# Patient Record
Sex: FEMALE | Race: ASIAN | URBAN - METROPOLITAN AREA
[De-identification: names, ages, dates, MRNs, and addresses within clinical notes are randomized per-mention and may not be internally consistent; named-entity substitution may affect disease eponyms.]

---

## 2018-10-08 ENCOUNTER — OFFICE VISIT (OUTPATIENT)
Dept: FAMILY MEDICINE | Facility: CLINIC | Age: 43
End: 2018-10-08
Payer: COMMERCIAL

## 2018-10-08 VITALS
BODY MASS INDEX: 21.97 KG/M2 | HEIGHT: 63 IN | TEMPERATURE: 97.2 F | WEIGHT: 124 LBS | OXYGEN SATURATION: 97 % | DIASTOLIC BLOOD PRESSURE: 55 MMHG | HEART RATE: 57 BPM | SYSTOLIC BLOOD PRESSURE: 106 MMHG

## 2018-10-08 DIAGNOSIS — R13.12 OROPHARYNGEAL DYSPHAGIA: Primary | ICD-10-CM

## 2018-10-08 NOTE — MR AVS SNAPSHOT
"              After Visit Summary   10/8/2018    Susy Shanks    MRN: [id number]           Patient Information     Date Of Birth          1975        Visit Information        Provider Department      10/8/2018 11:40 AM Siomara Burkett PA-C [facility]        Today's Diagnoses     Oropharyngeal dysphagia    -  1       Follow-ups after your visit        Who to contact     Please call your clinic at [phone] to:    Ask questions about your health    Make or cancel appointments    Discuss your medicines    Learn about your test results    Speak to your doctor            Additional Information About Your Visit        MyChart Information     Sunrise is an electronic gateway that provides easy, online access to your medical records. With Sunrise, you can request a clinic appointment, read your test results, renew a prescription or communicate with your care team.     To sign up for Sunrise visit the website at [email or web address]   You will be asked to enter the access code listed below, as well as some personal information. Please follow the directions to create your username and password.     Your access code is: [email or web address]  Expires: 2019  3:42 PM     Your access code will  in 90 days. If you need help or a new code, please contact your [facility] or call [phone] for assistance.        Care EveryWhere ID     This is your Care EveryWhere ID. This could be used by other organizations to access your [facility] medical records  [id number]        Your Vitals Were     Pulse Temperature Height Pulse Oximetry BMI (Body Mass Index)       57 97.2  F (36.2  C) (Oral) 5' 2.68\" (159.2 cm) 97% 22.19 kg/m2        Blood Pressure from Last 3 Encounters:   10/08/18 106/55   13 113/74    Weight from Last 3 Encounters:   10/08/18 124 lb (56.2 kg)   13 123 lb (55.8 kg)              Today, you had the following     No orders found for display       " Primary Care Provider Office Phone # Fax #    Em Ramírez [phone] [address] [place] [id number]        Equal Access to Services     JON ANSARI : Hadii aad ku hadmaria teresafreddy Matheus, wadayoda luqadaha, qaybta kabrannonda julio, vini ibanez parkerdilan katiedarrianlake leonard. So [facility] [place] [phone].    ATENCIÓN: Si habla español, tiene a palma disposición servicios gratuitos de asistencia lingüística. Llame al [phone].    We comply with applicable federal civil rights laws and [place] laws. We do not discriminate on the basis of race, color, national origin, age, disability, sex, sexual orientation, or gender identity.            Thank you!     Thank you for choosing [facility]  for your care. Our goal is always to provide you with excellent care. Hearing back from our patients is one way we can continue to improve our services. Please take a few minutes to complete the written survey that you may receive in the mail after your visit with us. Thank you!             Your Updated Medication List - Protect others around you: Learn how to safely use, store and throw away your medicines at www.disposemymeds.org.          This list is accurate as of 10/8/18  3:42 PM.  Always use your most recent med list.                   Brand Name Dispense Instructions for use Diagnosis    hydrocortisone 2.5 % cream    ANUSOL-HC    30 g    Place  rectally 2 times daily.    External hemorrhoid       NO ACTIVE MEDICATIONS

## 2018-10-08 NOTE — PROGRESS NOTES
"  SUBJECTIVE:   Susy Shanks is a 42 year old female who presents to clinic today for the following health issues:    Dysphagia:    Change in ability to swallow for the past few months.  She feels like food gets partially stuck in the upper throat.  Has not had anything acutally stuck.  No history of heart burn. No meds.  CPE by GYN provider up to date. Occurs at the top of the throat. Not an issue with water.     Does not seem related to illness in any way.  No history of reflux or dyspepsia.  No associated nausea or decreased appetite.        Problem list and histories reviewed & adjusted, as indicated.  Additional history: as documented    Patient Active Problem List   Diagnosis   (none) - all problems resolved or deleted     No past surgical history on file.    Social History   Substance Use Topics     Smoking status: Never Smoker     Smokeless tobacco: Never Used     Alcohol use Not on file     No family history on file.      Current Outpatient Prescriptions   Medication Sig Dispense Refill     NO ACTIVE MEDICATIONS        Allergies   Allergen Reactions     Cholera Vaccine Anaphylaxis       Reviewed and updated as needed this visit by clinical staff  Tobacco  Allergies  Meds  Problems       Reviewed and updated as needed this visit by Provider  Allergies  Meds  Problems         ROS:  Constitutional, HEENT, cardiovascular, pulmonary, gi and gu systems are negative, except as otherwise noted.    OBJECTIVE:     /55  Pulse 57  Temp 97.2  F (36.2  C) (Oral)  Ht 5' 2.68\" (159.2 cm)  Wt 124 lb (56.2 kg)  SpO2 97%  BMI 22.19 kg/m2  Body mass index is 22.19 kg/(m^2).  GENERAL: healthy, alert and no distress  EYES: Eyes grossly normal to inspection, PERRL and conjunctivae and sclerae normal  HENT: ear canals and TM's normal, nose and mouth without ulcers or lesions.  Posterior pharynx pink without inflammation or exudate.  NECK: no adenopathy, no asymmetry, masses, or scars and thyroid normal to " palpation  RESP: lungs clear to auscultation - no rales, rhonchi or wheezes  CV: regular rate and rhythm, normal S1 S2, no S3 or S4, no murmur, click or rub, no peripheral edema and peripheral pulses strong  ABDOMEN: soft, nontender, no hepatosplenomegaly, no masses and bowel sounds normal  SKIN: no suspicious lesions or rashes      ASSESSMENT/PLAN:       ICD-10-CM    1. Oropharyngeal dysphagia R13.12 XR Esophagram       Not sure if this is oropharyngeal or esophageal dysphagia.  Will recommend esophagram for eval to start.   Will have patient try omeprazole for the next week.  Will notify patient of results and next steps for evaluation.    Siomara Burkett PA-C  [facility]

## 2018-10-08 NOTE — NURSING NOTE
"42 year old  Chief Complaint   Patient presents with     Problems Swallowing     Swallowing issues. Been happening a few months and reports it feels like it takes forever for her food to good down and sometimes feels stuckl in her throat.       Blood pressure 106/55, pulse 57, temperature 97.2  F (36.2  C), temperature source Oral, height 5' 2.68\" (159.2 cm), weight 124 lb (56.2 kg), SpO2 97 %, not currently breastfeeding. Body mass index is 22.19 kg/(m^2).  Patient Active Problem List   Diagnosis   (none) - all problems resolved or deleted       Wt Readings from Last 2 Encounters:   10/08/18 124 lb (56.2 kg)   07/01/13 123 lb (55.8 kg)     BP Readings from Last 3 Encounters:   10/08/18 106/55   07/01/13 113/74         Current Outpatient Prescriptions   Medication     hydrocortisone (ANUSOL-HC) 2.5 % rectal cream     NO ACTIVE MEDICATIONS     No current facility-administered medications for this visit.        Social History   Substance Use Topics     Smoking status: Never Smoker     Smokeless tobacco: Never Used     Alcohol use Not on file       Health Maintenance Due   Topic Date Due     PHQ-2 Q1 YR  11/02/1987     TETANUS IMMUNIZATION (SYSTEM ASSIGNED)  11/02/1993     HIV SCREEN (SYSTEM ASSIGNED)  11/02/1993     PAP SCREENING Q3 YR (SYSTEM ASSIGNED)  11/02/1996     INFLUENZA VACCINE (1) 09/01/2018       No results found for: PAP      October 8, 2018 11:51 AM  "

## 2020-04-21 ENCOUNTER — VIRTUAL VISIT (OUTPATIENT)
Dept: FAMILY MEDICINE | Facility: CLINIC | Age: 45
End: 2020-04-21
Payer: COMMERCIAL

## 2020-04-21 VITALS — TEMPERATURE: 98.5 F

## 2020-04-21 DIAGNOSIS — H65.91 RIGHT NON-SUPPURATIVE OTITIS MEDIA: Primary | ICD-10-CM

## 2020-04-21 RX ORDER — AMOXICILLIN 875 MG
875 TABLET ORAL 2 TIMES DAILY
Qty: 20 TABLET | Refills: 0 | Status: SHIPPED | OUTPATIENT
Start: 2020-04-21

## 2020-04-21 NOTE — PATIENT INSTRUCTIONS
Thank you for connecting by video for your visit.    I think your symptoms are most likely a viral infection or allergies.  This is likely not COVID but please let me know if your symptoms worsen. Continue to monitor your temperature.    I would like you to take ibuprofen 400mg on a regular basis up to 3 times daily with food for the next 2 days.  Use flonase nasal spray:  2 sprays in each nostril once daily.  Drink plenty of fluids.  Though I sent an antibiotic to your pharmacy I would recommend you only take it if the ear pain in persistent and does not respond to the ibuprofen.    If you take the antibiotic:  Watch for signs of allergic reaction including swelling around the mouth or eyes and the development of a rash.  If you develop any of these symptoms, stop your medication, take a benadryl (25-50 mg) and give our office a call. Consider probiotic therapy to decrease the possibility of diarrhea associated with antibiotic use.    Please let me know if you have any questions.    Elizabeth Burkett PA-C

## 2020-04-21 NOTE — PROGRESS NOTES
"Family Medicine Video Visit Note  Susy is being evaluated via a billable video visit.               Video Visit Consent     Patient was verbally read the following and verbal consent was obtained.  \"This video visit will be conducted via a call between you and your physician/provider. We have found that certain health care needs can be provided without the need for an in-person physical exam.  This service lets us provide the care you need with a video conversation.  If a prescription is necessary we can send it directly to your pharmacy.  If lab work is needed we can place an order for that and you can then stop by our lab to have the test done at a later time.    If during the course of the call the physician/provider feels a video visit is not appropriate, you will not be charged for this service.\"     (Name person giving consent:  Patient   Date verbal consent given:  4/21/2020  Time verbal consent given:  4:15 PM)    Patient would like the video invitation sent by: Text to cell phone: [phone]       After Visit Information:  Patient chose to view AVS via [employer]    Chief Complaint   Patient presents with     Throat Problem     woke up with an sore throat Monday morning, only on right side of throat. Also woke up with an headache.     Ear Problem     right ear pain               HPI     Video Start Time: 4:21 PM    Ssuy presents  for the following health issues:    Acute Illness   Acute illness concerns: Sore throat for the past 3 days. Right sided primarily. Does not feel plugged. Mild seasonal allergies. Today she noted some pain in the right ear.  She took ibuprofen earlier today and it felt better.  Pain noted again when ibuprofen wore off.  Headaches.  Has been known to grind her teeth but no history of TMJ.  Does not wear a mouth guard.   No swelling of the ear and no worsening of pain with ear movement. No noted discharge.      Fever: no    Chills/Sweats: no    Headache (location?): YES- thinks it " "is stress related with COVID    Sinus Pressure:no    Conjunctivitis:  no    Ear Pain: YES: right    Rhinorrhea: no    Congestion: YES- mild thinks allergy related    Sore Throat: YES     Cough: no    Wheeze: no    Decreased Appetite: no    Nausea: no    Vomiting: no    Diarrhea:  no    Dysuria/Freq.: no    Fatigue/Achiness: YES- thinks it is stress related    Sick/Strep Exposure: no     Therapies Tried and outcome: as noted above      Current Outpatient Medications   Medication Sig Dispense Refill     amoxicillin (AMOXIL) 875 MG tablet Take 1 tablet (875 mg) by mouth 2 times daily 20 tablet 0     NO ACTIVE MEDICATIONS        Allergies   Allergen Reactions     Cholera Vaccine Anaphylaxis       Reviewed and updated as needed this visit by Provider  Tobacco  Allergies  Meds  Problems  Med Hx  Surg Hx  Fam Hx              Review of Systems:     ROS COMP: CONSTITUTIONAL: NEGATIVE for fever, chills, change in weight  EYES: NEGATIVE for vision changes or irritation  ENT/MOUTH: NEGATIVE for ear, mouth  Problems.  Throat as noted above.  RESP: NEGATIVE for significant cough or SOB  CV: NEGATIVE for chest pain, palpitations or peripheral edema  GI: NEGATIVE for nausea, abdominal pain, heartburn, or change in bowel habits         Physical Exam:     Temp 98.5  F (36.9  C) Patient reported today  Estimated body mass index is 22.19 kg/m  as calculated from the following:    Height as of 10/8/18: 1.592 m (5' 2.68\").    Weight as of 10/8/18: 56.2 kg (124 lb).    GENERAL: healthy, alert and no distress  EYES: Eyes grossly normal to inspection, PERRL and conjunctivae and sclerae normal  HENT: oropharynx clear and oral mucous membranes moist.  Posterior pharynx without inflammation or exudate.   NECK: Possible adenopathy on the right--patient notes tendernesss with palpation. No jaw pain and LING  RESP: No cough.  Breathing normally.  No wheeze  SKIN: no suspicious lesions or rashes  PSYCH: mentation appears normal, affect " normal/bright          Assessment and Plan     Assessment & Plan       ICD-10-CM    1. Right non-suppurative otitis media  H65.91 amoxicillin (AMOXIL) 875 MG tablet   Likely viral vs allergic etiology. Recommend avoiding the use of antibiotic if possible.    I think your symptoms are most likely a viral infection or allergies.  This is likely not COVID but please let me know if your symptoms worsen. Continue to monitor your temperature.    I would like you to take ibuprofen 400mg on a regular basis up to 3 times daily with food for the next 2 days.  Use flonase nasal spray:  2 sprays in each nostril once daily.  Drink plenty of fluids.  Though I sent an antibiotic to your pharmacy I would recommend you only take it if the ear pain in persistent and does not respond to the ibuprofen.    If you take the antibiotic:  Watch for signs of allergic reaction including swelling around the mouth or eyes and the development of a rash.  If you develop any of these symptoms, stop your medication, take a benadryl (25-50 mg) and give our office a call. Consider probiotic therapy to decrease the possibility of diarrhea associated with antibiotic use.     Follow up if you have any worsening or persistent problems or concerns.        Return if symptoms worsen or fail to improve.    Siomara Burkett PA-C  [facility]        Return if symptoms worsen or fail to improve.      Video-Visit Details    Type of service:  Video Visit    Video End Time (time video stopped): 4:33 PM    Originating Location (pt. Location): Home    Distant Location (provider location):  [facility]     Mode of Communication:  Video Conference via [employer]      Siomara Burkett PA-C

## 2020-12-13 ENCOUNTER — HEALTH MAINTENANCE LETTER (OUTPATIENT)
Age: 45
End: 2020-12-13

## 2021-06-12 ENCOUNTER — HEALTH MAINTENANCE LETTER (OUTPATIENT)
Age: 46
End: 2021-06-12

## 2021-10-02 ENCOUNTER — HEALTH MAINTENANCE LETTER (OUTPATIENT)
Age: 46
End: 2021-10-02

## 2022-01-16 ENCOUNTER — HEALTH MAINTENANCE LETTER (OUTPATIENT)
Age: 47
End: 2022-01-16

## 2022-07-03 ENCOUNTER — HEALTH MAINTENANCE LETTER (OUTPATIENT)
Age: 47
End: 2022-07-03

## 2023-01-14 ENCOUNTER — HEALTH MAINTENANCE LETTER (OUTPATIENT)
Age: 48
End: 2023-01-14

## 2023-04-23 ENCOUNTER — HEALTH MAINTENANCE LETTER (OUTPATIENT)
Age: 48
End: 2023-04-23

## 2023-07-16 ENCOUNTER — HEALTH MAINTENANCE LETTER (OUTPATIENT)
Age: 48
End: 2023-07-16